# Patient Record
Sex: MALE | Race: WHITE
[De-identification: names, ages, dates, MRNs, and addresses within clinical notes are randomized per-mention and may not be internally consistent; named-entity substitution may affect disease eponyms.]

---

## 2019-07-11 ENCOUNTER — HOSPITAL ENCOUNTER (EMERGENCY)
Dept: HOSPITAL 25 - UCEAST | Age: 59
Discharge: HOME | End: 2019-07-11
Payer: COMMERCIAL

## 2019-07-11 VITALS — SYSTOLIC BLOOD PRESSURE: 109 MMHG | DIASTOLIC BLOOD PRESSURE: 73 MMHG

## 2019-07-11 DIAGNOSIS — Z87.891: ICD-10-CM

## 2019-07-11 DIAGNOSIS — J40: Primary | ICD-10-CM

## 2019-07-11 PROCEDURE — 99202 OFFICE O/P NEW SF 15 MIN: CPT

## 2019-07-11 PROCEDURE — G0463 HOSPITAL OUTPT CLINIC VISIT: HCPCS

## 2019-07-11 NOTE — UC
Respiratory Complaint HPI





- HPI Summary


HPI Summary: 





59 year old male with no PMH no medications presents iwth cough x 3 week.  No 

improvement, non-productive, no fever, chills, no body aches, no GI symptoms.  

  no ear pain, throat pain.     cough worse with talking, AC air.   Better with 

warm, scarf.  











- History of Current Complaint


Chief Complaint: UCRespiratory


Stated Complaint: cough


Time Seen by Provider: 07/11/19 18:14


Hx Obtained From: Patient


Onset/Duration: Sudden Onset, Lasting Weeks


Timing: Constant


Severity Currently: None


Pain Intensity: 0


Pain Scale Used: 0-10 Numeric


Character: Cough: Nonproductive


Aggravating Factors: Exertion, Other - talking





- Allergies/Home Medications


Allergies/Adverse Reactions: 


 Allergies











Allergy/AdvReac Type Severity Reaction Status Date / Time


 


No Known Allergies Allergy   Verified 07/11/19 17:20














PMH/Surg Hx/FS Hx/Imm Hx


Previously Healthy: Yes





- Surgical History


Surgical History: None





- Family History


Known Family History: Positive: Non-Contributory





- Social History


Alcohol Use: None


Substance Use Type: None


Smoking Status (MU): Former Smoker


When Did the Patient Quit Smoking/Using Tobacco: 37 years ago





Review of Systems


All Other Systems Reviewed And Are Negative: Yes


Constitutional: Positive: Negative


Respiratory: Positive: Cough


Psychological: Positive: Negative


Is Patient Immunocompromised?: No





Physical Exam


Triage Information Reviewed: Yes


Appearance: Well-Appearing, No Pain Distress, Well-Nourished


Vital Signs: 


 Initial Vital Signs











Temp  98.3 F   07/11/19 17:16


 


Pulse  71   07/11/19 17:16


 


Resp  12   07/11/19 17:16


 


BP  109/73   07/11/19 17:16


 


Pulse Ox  99   07/11/19 17:16











Vital Signs Reviewed: Yes


Eyes: Positive: Conjunctiva Clear


ENT: Positive: Pharynx normal, TMs normal - fluid behind R TM, no erythema.  

Negative: TM bulging, TM dull, TM red


Neck: Positive: Supple, Nontender, No Lymphadenopathy


Respiratory: Positive: Chest non-tender, Lungs clear, Normal breath sounds, No 

respiratory distress, No accessory muscle use.  Negative: Crackles, Rhonchi, 

Stridor, Wheezing


Cardiovascular: Positive: RRR, No Murmur


Neurological Exam: Normal


Psychological Exam: Normal





Respiratory Course/Dx





- Course


Course Of Treatment: 





Acute bronchitis, discussed treatment including steriods, OTCs.  Patient chose 

steroids, increase fluid 








- Differential Dx/Diagnosis


Differential Diagnosis/HQI/PQRI: Bronchitis


Provider Diagnosis: 


 Bronchitis








Discharge





- Sign-Out/Discharge


Documenting (check all that apply): Patient Departure


All imaging exams completed and their final reports reviewed: No Studies





- Discharge Plan


Condition: Good


Disposition: HOME


Prescriptions: 


methylPREDNISolone [Medrol Dosepak 4 MG*] 1 leonardo PO .SEE LEONARDO INSTRUCTION #1 

packet


Patient Education Materials:  Acute Bronchitis (ED)


Referrals: 


No Primary Care Phys,NOPCP [Primary Care Provider] - 


[facility] [Outside]


Additional Instructions: 


- Medrol dose pack-  Steroids to help decrease inflammation, cough response.  

May stop at any time if side effects are too great 


- increase fluid intake 


- Humidifier as needed to help with dryness 


- Follow up with primary physician within 2-3 days if no improvement 








- Billing Disposition and Condition


Condition: GOOD


Disposition: Home